# Patient Record
Sex: MALE | Race: OTHER | NOT HISPANIC OR LATINO | URBAN - METROPOLITAN AREA
[De-identification: names, ages, dates, MRNs, and addresses within clinical notes are randomized per-mention and may not be internally consistent; named-entity substitution may affect disease eponyms.]

---

## 2018-11-12 ENCOUNTER — EMERGENCY (EMERGENCY)
Facility: HOSPITAL | Age: 28
LOS: 1 days | Discharge: HOME | End: 2018-11-12
Attending: EMERGENCY MEDICINE | Admitting: EMERGENCY MEDICINE
Payer: MEDICAID

## 2018-11-12 VITALS
OXYGEN SATURATION: 99 % | HEART RATE: 55 BPM | DIASTOLIC BLOOD PRESSURE: 57 MMHG | SYSTOLIC BLOOD PRESSURE: 108 MMHG | TEMPERATURE: 99 F | RESPIRATION RATE: 16 BRPM

## 2018-11-12 LAB
ALBUMIN SERPL ELPH-MCNC: 5 G/DL — SIGNIFICANT CHANGE UP (ref 3.3–5)
ALP SERPL-CCNC: 64 U/L — SIGNIFICANT CHANGE UP (ref 40–120)
ALT FLD-CCNC: 18 U/L — SIGNIFICANT CHANGE UP (ref 4–41)
AST SERPL-CCNC: 17 U/L — SIGNIFICANT CHANGE UP (ref 4–40)
BILIRUB SERPL-MCNC: 0.4 MG/DL — SIGNIFICANT CHANGE UP (ref 0.2–1.2)
BUN SERPL-MCNC: 13 MG/DL — SIGNIFICANT CHANGE UP (ref 7–23)
CALCIUM SERPL-MCNC: 9.7 MG/DL — SIGNIFICANT CHANGE UP (ref 8.4–10.5)
CHLORIDE SERPL-SCNC: 101 MMOL/L — SIGNIFICANT CHANGE UP (ref 98–107)
CO2 SERPL-SCNC: 28 MMOL/L — SIGNIFICANT CHANGE UP (ref 22–31)
CREAT SERPL-MCNC: 1.09 MG/DL — SIGNIFICANT CHANGE UP (ref 0.5–1.3)
GLUCOSE SERPL-MCNC: 90 MG/DL — SIGNIFICANT CHANGE UP (ref 70–99)
HCT VFR BLD CALC: 42.2 % — SIGNIFICANT CHANGE UP (ref 39–50)
HGB BLD-MCNC: 14.5 G/DL — SIGNIFICANT CHANGE UP (ref 13–17)
MCHC RBC-ENTMCNC: 30.9 PG — SIGNIFICANT CHANGE UP (ref 27–34)
MCHC RBC-ENTMCNC: 34.4 % — SIGNIFICANT CHANGE UP (ref 32–36)
MCV RBC AUTO: 89.8 FL — SIGNIFICANT CHANGE UP (ref 80–100)
NRBC # FLD: 0 — SIGNIFICANT CHANGE UP
PLATELET # BLD AUTO: 355 K/UL — SIGNIFICANT CHANGE UP (ref 150–400)
PMV BLD: 9.1 FL — SIGNIFICANT CHANGE UP (ref 7–13)
POTASSIUM SERPL-MCNC: 3.8 MMOL/L — SIGNIFICANT CHANGE UP (ref 3.5–5.3)
POTASSIUM SERPL-SCNC: 3.8 MMOL/L — SIGNIFICANT CHANGE UP (ref 3.5–5.3)
PROT SERPL-MCNC: 8.1 G/DL — SIGNIFICANT CHANGE UP (ref 6–8.3)
RBC # BLD: 4.7 M/UL — SIGNIFICANT CHANGE UP (ref 4.2–5.8)
RBC # FLD: 12.6 % — SIGNIFICANT CHANGE UP (ref 10.3–14.5)
SODIUM SERPL-SCNC: 140 MMOL/L — SIGNIFICANT CHANGE UP (ref 135–145)
WBC # BLD: 8.15 K/UL — SIGNIFICANT CHANGE UP (ref 3.8–10.5)
WBC # FLD AUTO: 8.15 K/UL — SIGNIFICANT CHANGE UP (ref 3.8–10.5)

## 2018-11-12 PROCEDURE — 99220: CPT

## 2018-11-12 RX ORDER — PENICILLIN V POTASSIUM 250 MG
500 TABLET ORAL ONCE
Qty: 0 | Refills: 0 | Status: COMPLETED | OUTPATIENT
Start: 2018-11-12 | End: 2018-11-12

## 2018-11-12 RX ORDER — ACETAMINOPHEN 500 MG
650 TABLET ORAL ONCE
Qty: 0 | Refills: 0 | Status: COMPLETED | OUTPATIENT
Start: 2018-11-12 | End: 2018-11-12

## 2018-11-12 RX ADMIN — Medication 500 MILLIGRAM(S): at 16:11

## 2018-11-12 NOTE — ED CDU PROVIDER INITIAL DAY NOTE - MEDICAL DECISION MAKING DETAILS
28 y.o male no pmhx coming in with 1 week of intermittent L eye blurry vision- sent to CDU for MRI/MRA, Optho.

## 2018-11-12 NOTE — ED CDU PROVIDER INITIAL DAY NOTE - ATTENDING CONTRIBUTION TO CARE
I performed a face to face bedside interview with patient regarding history of present illness, review of symptoms and past medical history. I completed an independent physical exam.  I have discussed patient's plan of care.   I agree with note as stated above, having amended the EMR as needed to reflect my findings. I have discussed the assessment and plan of care.  This includes during the time I functioned as the attending physician for this patient.  Attending Contribution to Care: agree with plan of PA. no pmhx coming in with 1 week of intermittent L eye blurry vision - was seen by outpatient Neuroophthalmologist Dr. Samano diagnosed with ? optic neuropathy had patient scheduled for MRI next Wednesday.  Pt states L eye blurry vision returned and felt generalized weakness which prompted him to come in.

## 2018-11-12 NOTE — ED PROVIDER NOTE - NS ED ROS FT
Constitutional: - Fever, - Chills, - Anorexia, + Fatigue  Eyes: - Discharge, - Irritation, - Redness, + L Visual changes, - Light sensitivity  EARS: - Ear Pain, - Apparent hearing problems  NOSE: - Congestion, - Bloody nose  MOUTH/THROAT: - Vocal Changes, - Drooling, - Sore throat, + Dental Pain  NECK: - Lumps, - Stiffness, - Pain  NEURO: - Change in behavior, - Dec. Alertness, + Headache, - Dizziness, - Numbness/Tingling, - Change in speech, - Weakness

## 2018-11-12 NOTE — ED ADULT NURSE NOTE - OBJECTIVE STATEMENT
Pt c/oon and off loss of vision in left eye on/off for past couple of days.  Pt saw eye doctor and was told he has retinal hemorrhage of left eye and retinal vascular abnormality. Has mild headache now, no visual loss presently denies need for pain medication at this time.

## 2018-11-12 NOTE — ED ADULT TRIAGE NOTE - CHIEF COMPLAINT QUOTE
Pt c/o loss of vision in left eye on/off for past couple of days.  Pt saw eye doctor and was told he has retinal hemorrhage of left eye and retinal vascular abnormality.

## 2018-11-12 NOTE — ED CDU PROVIDER INITIAL DAY NOTE - OBJECTIVE STATEMENT
28 y.o male no pmhx coming in with 1 week of intermittent L eye blurry vision - was seen by outpatient Neuroophthalmologist Dr. Samano who had patient scheduled for MRI next Wednesday.  Pt states L eye blurry vision returned and felt. Denies   In the ED , Dr. Samano was contacted who made recommendations for imaging. Denies any current symptoms. 28 y.o male no pmhx coming in with 1 week of intermittent L eye blurry vision - was seen by outpatient Neuroophthalmologist Dr. Samano diagnosed with ? optic neuropathy had patient scheduled for MRI next Wednesday.  Pt states L eye blurry vision returned and felt generalized weakness which prompted him to come in. Denies fevers, chills, chest pain, sob, headache, dizziness, n/v/d, abdominal pain, numbness, tingling ,weakness. In the ED , Dr. Samano was contacted who made recommendations for imaging and was sent to CDU for MRI/ MRA. Optho was also consulted to see patient;. Denies any current symptoms.

## 2018-11-12 NOTE — ED PROVIDER NOTE - MEDICAL DECISION MAKING DETAILS
29 yo male with likely left optic neuropathy and dental infection. will consult optho for change in vision, pcn for infection, consider CDU stay for consults and MRI pending recommendations from optho.

## 2018-11-12 NOTE — ED PROVIDER NOTE - PHYSICAL EXAMINATION
PE:   GEN: Awake, alert, interactive, NAD, non-toxic appearing.   HEAD: Atraumatic  EYES: Sclera white, conjunctiva pink, PERRLA  EYE exam: VA: 20/20 R Eye: 20/20 L Eye: 20/20, visual fields intact. Sclera white, conjunctiva pink, Cornea clear. no chemosis, no perioribital swelling, no drainage/discharge. No Photosenstivity. PERRLA, EOMI. No FB on gross visualization or on lid eversion. Left Eye IOP = 18  MOUTH/DENTAL: Airway patent, uvula midline. Tooth #10 is chipped. no gingival edema or fluctuance. + percussion tenderness.  NEURO: AOx3, CN II-XII grossly intact without focal deficit.

## 2018-11-12 NOTE — ED CDU PROVIDER INITIAL DAY NOTE - DETAILS
28 y.o male no pmhx coming in with 1 week of intermittent L eye blurry vision- sent to CDU for MRI/MRA. 28 y.o male no pmhx coming in with 1 week of intermittent L eye blurry vision- sent to CDU for MRI/MRA, Optho.

## 2018-11-12 NOTE — ED PROVIDER NOTE - PROGRESS NOTE DETAILS
optho paged, pending call back. spoke with optho, recommend MRI and will see patient if MR is abnormal  discussed with Dr. Urban, CDU-->MR  discussed with Dr. Urrutia, accepted patient  paged Dr. Samano ([phone]) awaiting call back to determine best study spoke with Dr. Samano (cell) [phone] (fax)[phone]  recommends MR brain/orbits w/wo contrast and MR angio brain and neck w/ contrast.  call his cell if any changes or if + studies

## 2018-11-12 NOTE — ED PROVIDER NOTE - OBJECTIVE STATEMENT
this is a 27 yo male, previously healthy, presenting to the ED with complaints of changing and worsening left eye vision loss. Explains that the vision loss is episodic and started about 1 week ago and was just a small region of blackness. Pt went to neuro-optho at [place], Dr. Selwyn Tovar, and has scheduled fu in 2 weeks and a scheduled head/orbit MRI in 1 week to evaluate for likely Ileana Syndrome. Pt explains vision loss remains intermittent, but has changed in quality, to having blurred vision, floaters, spots of lights, and at times complete vision loss. It is now associated with mild headache, feeling of pressure behind the left eye and general fatigue. Pt additionally reports dental pain that has recently worsened due to previously chipped tooth that is sensitive to cold / hot foods and is concerned for infection. Pt denies fever, chills, change in voice, neck pain, chest pain, or any other concerns. PT brought copy of neuro-optho note and a copy was made with consent and reviewed.

## 2018-11-12 NOTE — ED PROVIDER NOTE - ATTENDING CONTRIBUTION TO CARE
MARKY GARCÍA MD: 29 yo male no significant past medical history presents with complaints of intermittent changing and loss of vision in the LEFT eye. Patient states about 1 week ago he developed a LEFT eye dark spot x several days.  Patient states he was scheduled for an outpatient MRI in 2 days, but told by specialist to go to the ER for any HA or other symptoms.  Patient states today he has a mild HA, but it's not typical for him, gradual onset and generalized fatigue.   Patient denies any visual complaints at present, but states last episode was en route on the way to the ER and one earlier this morning.      PHYSICAL EXAM:    GENERAL: Patient is awake and alert and in no acute distress.  Non-toxic appearing.  A+Ox4  HEAD:  Airway patent.  No oropharyngeal edema.  No stridor.  Auricles are normal.    EYES: EOM grossly intact, conjunctiva non-injected and sclera clear  NECK: Supple, No vertebral point tenderness to palpation.  CHEST/LUNG: Lungs clear to auscultation bilaterally; no wheeze, no rhonchi,  no rales.    HEART: Regular rate and rhythm;   ABDOMEN: Soft, non-tender to palpation.  No rebound/no guarding.  Bowel sounds present x 4.   MSK/EXTREMITIES: No clubbing or cyanosis. Back is nontender, with no vertebral point tenderness to palpation, no CVAT.  Moving all 4 extremities.     NEURO: Neurologically grossly intact.   No obvious deficits.   PSYCH: Psychiatrically normal mood and affect.  No apparent risk to self or others.       DR. FREEMAN, ATTENDING MD:    I performed a face to face bedside interview with patient regarding history of present illness, review of symptoms and past medical history. I completed an independent physical exam.  I have discussed patient's plan of care with the team of health care providers.   I agree with note as stated above, having amended the EMR as needed to reflect my findings. I have discussed the assessment and plan of care.  This includes during the time I functioned as the attending physician for this patient.

## 2018-11-13 VITALS
SYSTOLIC BLOOD PRESSURE: 111 MMHG | OXYGEN SATURATION: 99 % | HEART RATE: 52 BPM | TEMPERATURE: 98 F | DIASTOLIC BLOOD PRESSURE: 72 MMHG | RESPIRATION RATE: 16 BRPM

## 2018-11-13 PROCEDURE — 70553 MRI BRAIN STEM W/O & W/DYE: CPT | Mod: 26

## 2018-11-13 PROCEDURE — 99217: CPT

## 2018-11-13 PROCEDURE — 70548 MR ANGIOGRAPHY NECK W/DYE: CPT | Mod: 26

## 2018-11-13 PROCEDURE — 70543 MRI ORBT/FAC/NCK W/O &W/DYE: CPT | Mod: 26

## 2018-11-13 NOTE — ED CDU PROVIDER SUBSEQUENT DAY NOTE - PROGRESS NOTE DETAILS
Pt resting comfortably. No complaints. went for MRI last night, pending results. VSS. Will continue to observe and reassess in am [place]:  Spoke with patient's neuro-ophthalmologist, Dr. Guy Samano ([phone]).  Will discharge patient with copy of MRI report and patient to follow up with Dr. Samano  2pm at 11/14/18 Patient signed out to me to f/u MRI. Patient reassessed, sitting comfortably in bed in NAD, states seeing black spot in left eye, usually in the morning. MR reports shows changes of white matter, no significant finding. Discussed with attending, patient can be discharged home to f/u with PCP and Dr. Samano. The patient was given verbal and written discharge instructions. Specifically, instructions when to return to the ED and when to seek follow-up from their pcp was discussed. Any specialty follow-up was discussed, including how to make an appointment.  Instructions were discussed in simple, plain language and was understood by the patient. The patient understands that should their symptoms worsen or any new symptoms arise, they should return to the ED immediately for further evaluation. All pt's questions were answered. Patient verbalizes understanding.

## 2018-11-13 NOTE — ED CDU PROVIDER DISPOSITION NOTE - NSFOLLOWUPINSTRUCTIONS_ED_ALL_ED_FT
Please follow up with Dr. Samano (neuro-ophthalmologist) tomorrow 11/14/18 at 2pm.  Call him at [phone] if you need to re-schedule.

## 2018-11-13 NOTE — ED CDU PROVIDER SUBSEQUENT DAY NOTE - HISTORY
29 yo M, with no significant PMH p/w intermittent left blurry vision x1 week, sent to CDU for MRI/MRA, Optho.
parents

## 2018-11-13 NOTE — ED CDU PROVIDER SUBSEQUENT DAY NOTE - MEDICAL DECISION MAKING DETAILS
27 yo M, with no significant PMH p/w intermittent left blurry vision x1 week, sent to CDU for MRI/MRA, Optho.

## 2018-11-13 NOTE — ED CDU PROVIDER DISPOSITION NOTE - CLINICAL COURSE
[place]:  28M presented to ED with 1 week of intermittent left eye visual disturbance.  Sent to CDU for MRI/MRA. No acute findings on MRI/MRA.  Discharge home with copy of report and patient to follow up with his neuro-ophthalmologist tomorrow.

## 2018-11-13 NOTE — ED CDU PROVIDER SUBSEQUENT DAY NOTE - ATTENDING CONTRIBUTION TO CARE
Dr. Jackman:  I performed a face to face bedside interview with patient regarding history of present illness, review of symptoms and past medical history. I completed an independent physical exam.  I have discussed patient's plan of care with PA.   I agree with note as stated above, having amended the EMR as needed to reflect my findings.   This includes HISTORY OF PRESENT ILLNESS, HIV, PAST MEDICAL/SURGICAL/FAMILY/SOCIAL HISTORY, ALLERGIES AND HOME MEDICATIONS, REVIEW OF SYSTEMS, PHYSICAL EXAM, and any PROGRESS NOTES during the time I functioned as the attending physician for this patient.    28M presented to ED with 1 week of intermittent left eye visual disturbance.  Sent to CDU for MRI/MRA.  Describes a "dark spot" in center of vision this morning, which pt states he has been getting every morning that self-resolves in approximately 45min.    Exam:  - nad  - eomi, perrl  - rrr  - ctab  - abd soft ntnd    A/P  - visual defect, unclear etiology  - pending MRI/MRA read

## 2023-05-09 NOTE — ED CDU PROVIDER SUBSEQUENT DAY NOTE - CROS ED ROS STATEMENT
all other ROS negative except as per HPI Hydroxyzine Pregnancy And Lactation Text: This medication is not safe during pregnancy and should not be taken. It is also excreted in breast milk and breast feeding isn't recommended.

## 2023-12-18 ENCOUNTER — EMERGENCY (EMERGENCY)
Facility: HOSPITAL | Age: 33
LOS: 0 days | Discharge: HOME | End: 2023-12-18
Attending: STUDENT IN AN ORGANIZED HEALTH CARE EDUCATION/TRAINING PROGRAM
Payer: SELF-PAY

## 2023-12-18 VITALS
DIASTOLIC BLOOD PRESSURE: 78 MMHG | HEART RATE: 70 BPM | SYSTOLIC BLOOD PRESSURE: 115 MMHG | TEMPERATURE: 99 F | HEIGHT: 69 IN | RESPIRATION RATE: 18 BRPM | WEIGHT: 149.91 LBS | OXYGEN SATURATION: 100 %

## 2023-12-18 VITALS
OXYGEN SATURATION: 97 % | HEART RATE: 71 BPM | DIASTOLIC BLOOD PRESSURE: 74 MMHG | SYSTOLIC BLOOD PRESSURE: 112 MMHG | RESPIRATION RATE: 16 BRPM | TEMPERATURE: 98 F

## 2023-12-18 DIAGNOSIS — S62.307A UNSPECIFIED FRACTURE OF FIFTH METACARPAL BONE, LEFT HAND, INITIAL ENCOUNTER FOR CLOSED FRACTURE: ICD-10-CM

## 2023-12-18 DIAGNOSIS — R60.0 LOCALIZED EDEMA: ICD-10-CM
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
## 2023-12-18 DIAGNOSIS — Z91.013 ALLERGY TO SEAFOOD: ICD-10-CM
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
## 2023-12-18 DIAGNOSIS — M79.642 PAIN IN LEFT HAND: ICD-10-CM

## 2023-12-18 PROBLEM — H46.9 UNSPECIFIED OPTIC NEURITIS: Chronic | Status: ACTIVE | Noted: 2018-11-12

## 2023-12-18 PROCEDURE — 26600 TREAT METACARPAL FRACTURE: CPT | Mod: 54,LT

## 2023-12-18 PROCEDURE — 73130 X-RAY EXAM OF HAND: CPT | Mod: 26,LT,76

## 2023-12-18 PROCEDURE — 99284 EMERGENCY DEPT VISIT MOD MDM: CPT | Mod: 57

## 2023-12-18 PROCEDURE — 99053 MED SERV 10PM-8AM 24 HR FAC: CPT

## 2023-12-18 NOTE — ED PROVIDER NOTE - PHYSICAL EXAMINATION
General: Well appearing male in no acute distress  HEENT: Normocephalic, atraumatic. Moist mucous membranes. Oropharynx clear. No lymphadenopathy.  Eyes: No scleral icterus. EOMI. LÁZARO.  Neck:. Soft and supple. Full ROM without pain. No midline tenderness  Cardiac: Regular rate and regular rhythm. No murmurs, rubs, gallops. Peripheral pulses 2+ and symmetric. No LE edema.  Resp: Lungs CTAB. Speaking in full sentences. No wheezes, rales or rhonchi.  Abd: Soft, non-tender, non-distended. No guarding or rebound. No scars, masses, or lesions.  Back: Spine midline and non-tender. No CVA tenderness.    Skin: No rashes, abrasions, or lacerations.  Neuro: AO x 3. Moves all extremities symmetrically. Motor strength and sensation grossly intact.  MSK: edema over dorsum of R hand near 5th MCP region, minimally tender, able to make closed fist, thumbs up sign and OK sign. superficial abrasion over area of edema, no laceration/wound. radial pulse 2+, sensation intact. compartment of LUE are soft.

## 2023-12-18 NOTE — ED PROVIDER NOTE - CARE PROVIDER_API CALL
Ana Phipps  Plastic Surgery  [address] [place] [address], [address]  [place] [address]  Phone: [phone]  Fax: [phone]  Follow Up Time: 1-3 Days   Ana Phipps  Plastic Surgery  [address] [place] [address], [address]  [place] [address]  Phone: [phone]  Fax: [phone]  Follow Up Time: 1-3 Days

## 2023-12-18 NOTE — ED PROCEDURE NOTE - CPROC ED TIME OUT STATEMENT1
“Patient's name, , procedure and correct site were confirmed during the [place] Timeout.” “Patient's name, , procedure and correct site were confirmed during the [place] Timeout.”

## 2023-12-18 NOTE — ED PROVIDER NOTE - NSFOLLOWUPINSTRUCTIONS_ED_ALL_ED_FT
Keep splint on at all times.   followup with Dr. duran, hand surgeon, in next 7 days.    Fracture    A fracture is a break in one of your bones. This can occur from a variety of injuries, especially traumatic ones. Symptoms include pain, bruising, or swelling. Do not use the injured limb. If a fracture is in one of the bones below your waist, do not put weight on that limb unless instructed to do so by your healthcare provider. Crutches or a cane may have been provided. A splint or cast may have been applied by your health care provider. Make sure to keep it dry and follow up with an orthopedist as instructed.    SEEK IMMEDIATE MEDICAL CARE IF YOU HAVE ANY OF THE FOLLOWING SYMPTOMS: numbness, tingling, increasing pain, or weakness in any part of the injured limb.

## 2023-12-18 NOTE — ED PROVIDER NOTE - CLINICAL SUMMARY MEDICAL DECISION MAKING FREE TEXT BOX
states he punched a sheet rock wall w/ L hand. endorsing L hand dominant but also uses his R hand. 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   edema over 5th MCP region of the L hand dorsal aspect, minimal tenderness, superficial abrasion, no laceration/wound. neurovascularly intact. able to make closed fist but with pain, able to make thumbs up/OK sign.   xray - r/o fracture    xray shows displaced comminuted boxer fracture on wet read. is transverse and oblique. reduction attempted with Erin reduction technique but unsuccessful. patient made aware he needs to f/u with hand surgeon outpatient. spoke to dr. duran, hand surgeon on call who will see him in office. patient is comfortable w/ plan, return precautions discussed. ulnar gutter splint placed. 34 y/o M no pmhx presents w/ L hand pain. states he punched a sheet rock wall w/ L hand. endorsing L hand dominant but also uses his R hand. 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   edema over 5th MCP region of the L hand dorsal aspect, minimal tenderness, superficial abrasion, no laceration/wound. neurovascularly intact. able to make closed fist but with pain, able to make thumbs up/OK sign.   xray - r/o fracture    xray shows displaced comminuted boxer fracture on wet read. is transverse and oblique. reduction attempted with Jluiss reduction technique but unsuccessful. patient made aware he needs to f/u with hand surgeon outpatient. spoke to dr. duran, hand surgeon on call who will see him in office. patient is comfortable w/ plan, return precautions discussed. ulnar gutter splint placed. 32 y/o M no pmhx presents w/ L hand pain. states he punched a sheet rock wall w/ L hand. endorsing L hand dominant but also uses his R hand. 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   edema over 5th MCP region of the L hand dorsal aspect, minimal tenderness, superficial abrasion, no laceration/wound. neurovascularly intact. able to make closed fist but with pain, able to make thumbs up/OK sign.   xray - r/o fracture    xray shows displaced comminuted boxer fracture on wet read. is transverse and oblique. reduction attempted with Jluiss reduction technique but unsuccessful. patient made aware he needs to f/u with hand surgeon outpatient. spoke to dr. duran, hand surgeon on call who will see him in office. patient is comfortable w/ plan, return precautions discussed. ulnar gutter splint placed.

## 2023-12-18 NOTE — ED ADULT NURSE NOTE - OBJECTIVE STATEMENT
Pt is AOx4 c/o L hand injury. Pt is c/o swelling and pain to L hand after punching wall. Swelling and small abrasion noted to hand. Pt denies numbness/tingling, pulses and sensation intact on exam. Pt able to perform full ROM. Denies pmhx.

## 2023-12-18 NOTE — ED PROVIDER NOTE - PATIENT PORTAL LINK FT
You can access the FollowMyHealth Patient Portal offered by [facility] by registering at the following website: [email or web address]. By joining [employer]’s FollowMyHealth portal, you will also be able to view your health information using other applications (apps) compatible with our system. You can access the FollowMyHealth Patient Portal offered by [facility] by registering at the following website: [email or web address]. By joining [employer]’s FollowMyHealth portal, you will also be able to view your health information using other applications (apps) compatible with our system.

## 2023-12-18 NOTE — ED ADULT NURSE NOTE - NSFALLUNIVINTERV_ED_ALL_ED
Bed/Stretcher in lowest position, wheels locked, appropriate side rails in place/Call bell, personal items and telephone in reach/Instruct patient to call for assistance before getting out of bed/chair/stretcher/Non-slip footwear applied when patient is off stretcher/[place] to call system/Physically safe environment - no spills, clutter or unnecessary equipment/Purposeful proactive rounding/Room/bathroom lighting operational, light cord in reach Bed/Stretcher in lowest position, wheels locked, appropriate side rails in place/Call bell, personal items and telephone in reach/Instruct patient to call for assistance before getting out of bed/chair/stretcher/Non-slip footwear applied when patient is off stretcher/[place] to call system/Physically safe environment - no spills, clutter or unnecessary equipment/Purposeful proactive rounding/Room/bathroom lighting operational, light cord in reach

## 2023-12-18 NOTE — ED PROVIDER NOTE - OBJECTIVE STATEMENT
34 y/o M no pmhx presents w/ L hand pain. states he punched a sheet rock wall w/ L hand. endorsing L hand dominant but also uses his R hand. 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 32 y/o M no pmhx presents w/ L hand pain. states he punched a sheet rock wall w/ L hand. endorsing L hand dominant but also uses his R hand. 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

## 2024-01-24 PROBLEM — Z00.00 ENCOUNTER FOR PREVENTIVE HEALTH EXAMINATION: Status: ACTIVE | Noted: 2024-01-24

## 2024-01-31 ENCOUNTER — APPOINTMENT (OUTPATIENT)
Dept: ORTHOPEDIC SURGERY | Facility: HOSPITAL | Age: 34
End: 2024-01-31

## 2024-01-31 ENCOUNTER — APPOINTMENT (OUTPATIENT)
Dept: RADIOLOGY | Facility: HOSPITAL | Age: 34
End: 2024-01-31

## 2024-01-31 ENCOUNTER — OUTPATIENT (OUTPATIENT)
Dept: OUTPATIENT SERVICES | Facility: HOSPITAL | Age: 34
LOS: 1 days | End: 2024-01-31
Payer: SELF-PAY

## 2024-01-31 ENCOUNTER — RESULT REVIEW (OUTPATIENT)
Age: 34
End: 2024-01-31

## 2024-01-31 VITALS
DIASTOLIC BLOOD PRESSURE: 80 MMHG | WEIGHT: 157 LBS | TEMPERATURE: 98 F | HEART RATE: 59 BPM | BODY MASS INDEX: 23.25 KG/M2 | SYSTOLIC BLOOD PRESSURE: 119 MMHG | HEIGHT: 69 IN

## 2024-01-31 DIAGNOSIS — S62.92XS: ICD-10-CM

## 2024-01-31 PROCEDURE — 73130 X-RAY EXAM OF HAND: CPT | Mod: 26,LT

## 2024-01-31 PROCEDURE — 73100 X-RAY EXAM OF WRIST: CPT | Mod: 26,LT

## 2024-02-02 DIAGNOSIS — S62.327A DISPLACED FRACTURE OF SHAFT OF FIFTH METACARPAL BONE, LEFT HAND, INITIAL ENCOUNTER FOR CLOSED FRACTURE: ICD-10-CM

## 2024-02-05 ENCOUNTER — OUTPATIENT (OUTPATIENT)
Dept: OUTPATIENT SERVICES | Facility: HOSPITAL | Age: 34
LOS: 1 days | End: 2024-02-05
Payer: SELF-PAY

## 2024-02-05 VITALS
SYSTOLIC BLOOD PRESSURE: 126 MMHG | DIASTOLIC BLOOD PRESSURE: 80 MMHG | HEIGHT: 69 IN | WEIGHT: 156.97 LBS | OXYGEN SATURATION: 97 % | TEMPERATURE: 99 F | HEART RATE: 67 BPM | RESPIRATION RATE: 14 BRPM

## 2024-02-05 DIAGNOSIS — Z01.818 ENCOUNTER FOR OTHER PREPROCEDURAL EXAMINATION: ICD-10-CM

## 2024-02-05 DIAGNOSIS — S62.309A UNSPECIFIED FRACTURE OF UNSPECIFIED METACARPAL BONE, INITIAL ENCOUNTER FOR CLOSED FRACTURE: ICD-10-CM

## 2024-02-05 DIAGNOSIS — S62.327A DISPLACED FRACTURE OF SHAFT OF FIFTH METACARPAL BONE, LEFT HAND, INITIAL ENCOUNTER FOR CLOSED FRACTURE: ICD-10-CM
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
## 2024-02-05 LAB
HCT VFR BLD CALC: 42.6 % — SIGNIFICANT CHANGE UP (ref 39–50)
HGB BLD-MCNC: 14.6 G/DL — SIGNIFICANT CHANGE UP (ref 13–17)
MCHC RBC-ENTMCNC: 31 PG — SIGNIFICANT CHANGE UP (ref 27–34)
MCHC RBC-ENTMCNC: 34.3 GM/DL — SIGNIFICANT CHANGE UP (ref 32–36)
MCV RBC AUTO: 90.4 FL — SIGNIFICANT CHANGE UP (ref 80–100)
NRBC # BLD: 0 /100 WBCS — SIGNIFICANT CHANGE UP (ref 0–0)
PLATELET # BLD AUTO: 272 K/UL — SIGNIFICANT CHANGE UP (ref 150–400)
RBC # BLD: 4.71 M/UL — SIGNIFICANT CHANGE UP (ref 4.2–5.8)
RBC # FLD: 12.8 % — SIGNIFICANT CHANGE UP (ref 10.3–14.5)
WBC # BLD: 7.31 K/UL — SIGNIFICANT CHANGE UP (ref 3.8–10.5)
WBC # FLD AUTO: 7.31 K/UL — SIGNIFICANT CHANGE UP (ref 3.8–10.5)

## 2024-02-05 PROCEDURE — 85027 COMPLETE CBC AUTOMATED: CPT

## 2024-02-05 PROCEDURE — G0463: CPT

## 2024-02-05 RX ORDER — SODIUM CHLORIDE 9 MG/ML
3 INJECTION INTRAMUSCULAR; INTRAVENOUS; SUBCUTANEOUS EVERY 8 HOURS
Refills: 0 | Status: DISCONTINUED | OUTPATIENT
Start: 2024-02-15 | End: 2024-03-01

## 2024-02-05 RX ORDER — SODIUM CHLORIDE 9 MG/ML
1000 INJECTION, SOLUTION INTRAVENOUS
Refills: 0 | Status: DISCONTINUED | OUTPATIENT
Start: 2024-02-15 | End: 2024-03-01

## 2024-02-05 NOTE — H&P PST ADULT - NSANTHOSAYNRD_GEN_A_CORE
No. NORI screening performed.  STOP BANG Legend: 0-2 = LOW Risk; 3-4 = INTERMEDIATE Risk; 5-8 = HIGH Risk

## 2024-02-05 NOTE — H&P PST ADULT - PSYCHIATRIC
normal affect/alert and oriented x3/normal behavior details… S Plasty Text: Given the location and shape of the defect, and the orientation of relaxed skin tension lines, an S-plasty was deemed most appropriate for repair.  Using a sterile surgical marker, the appropriate outline of the S-plasty was drawn, incorporating the defect and placing the expected incisions within the relaxed skin tension lines where possible.  The area thus outlined was incised deep to adipose tissue with a #15 scalpel blade.  The skin margins were undermined to an appropriate distance in all directions utilizing iris scissors. The skin flaps were advanced over the defect.  The opposing margins were then approximated with interrupted buried subcutaneous sutures.

## 2024-02-05 NOTE — H&P PST ADULT - ASSESSMENT
Dental: denies loose teeth, no dentures    DASI: Active,    Score: 8.5 Dental: denies loose teeth, no dentures    DASI: Active, walking   Score: 8.5

## 2024-02-05 NOTE — H&P PST ADULT - NSICDXFAMILYHX_GEN_ALL_CORE_FT
FAMILY HISTORY:  Father  Still living? Yes, Estimated age: 71-80  FH: type 2 diabetes, Age at diagnosis: Age Unknown

## 2024-02-05 NOTE — H&P PST ADULT - PROBLEM SELECTOR PLAN 1
Scheduled for ORIF, left fifth metacarpal shaft fracture 2/15/2024  Preop instructions reviewed, written instructions sent home with patient  CBC drawn and sent, pending results  Chlorhexidine to affected area preop

## 2024-02-05 NOTE — H&P PST ADULT - HISTORY OF PRESENT ILLNESS
Mr. Alvarez is a 33 year old man with no significant medical history who sustained a left small finger injury on 12/17/2023.  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 Mr. Alvarez is a 33 year old man with no significant medical history who sustained a left small finger injury on 12/17/2023.   While home a lead pipe fell on his hand and dx with fracture of left hand, scheduled for ORIF left fifth metacarpal shaft fx on 2/15/2024.

## 2024-02-14 ENCOUNTER — TRANSCRIPTION ENCOUNTER (OUTPATIENT)
Age: 34
End: 2024-02-14

## 2024-02-15 ENCOUNTER — APPOINTMENT (OUTPATIENT)
Dept: ORTHOPEDIC SURGERY | Facility: HOSPITAL | Age: 34
End: 2024-02-15

## 2024-02-15 ENCOUNTER — OUTPATIENT (OUTPATIENT)
Dept: OUTPATIENT SERVICES | Facility: HOSPITAL | Age: 34
LOS: 1 days | End: 2024-02-15
Payer: SELF-PAY

## 2024-02-15 ENCOUNTER — TRANSCRIPTION ENCOUNTER (OUTPATIENT)
Age: 34
End: 2024-02-15

## 2024-02-15 VITALS
HEART RATE: 57 BPM | OXYGEN SATURATION: 98 % | DIASTOLIC BLOOD PRESSURE: 60 MMHG | HEIGHT: 69 IN | SYSTOLIC BLOOD PRESSURE: 97 MMHG | RESPIRATION RATE: 16 BRPM | TEMPERATURE: 97 F | WEIGHT: 151.9 LBS

## 2024-02-15 VITALS
SYSTOLIC BLOOD PRESSURE: 97 MMHG | RESPIRATION RATE: 17 BRPM | OXYGEN SATURATION: 98 % | DIASTOLIC BLOOD PRESSURE: 53 MMHG | HEART RATE: 50 BPM

## 2024-02-15 DIAGNOSIS — Z01.818 ENCOUNTER FOR OTHER PREPROCEDURAL EXAMINATION: ICD-10-CM

## 2024-02-15 DIAGNOSIS — S62.327A DISPLACED FRACTURE OF SHAFT OF FIFTH METACARPAL BONE, LEFT HAND, INITIAL ENCOUNTER FOR CLOSED FRACTURE: ICD-10-CM

## 2024-02-15 PROCEDURE — 76000 FLUOROSCOPY <1 HR PHYS/QHP: CPT

## 2024-02-15 PROCEDURE — C1713: CPT

## 2024-02-15 PROCEDURE — 26615 TREAT METACARPAL FRACTURE: CPT | Mod: LT

## 2024-02-15 DEVICE — K-WIRE MEDARTIS DOUBLE TROCAR 1.2MM X 150MM: Type: IMPLANTABLE DEVICE | Status: FUNCTIONAL

## 2024-02-15 DEVICE — IMPLANTABLE DEVICE: Type: IMPLANTABLE DEVICE | Status: FUNCTIONAL

## 2024-02-15 RX ORDER — CEFAZOLIN SODIUM 1 G
2000 VIAL (EA) INJECTION ONCE
Refills: 0 | Status: COMPLETED | OUTPATIENT
Start: 2024-02-15 | End: 2024-02-15

## 2024-02-15 RX ORDER — CHLORHEXIDINE GLUCONATE 213 G/1000ML
1 SOLUTION TOPICAL ONCE
Refills: 0 | Status: COMPLETED | OUTPATIENT
Start: 2024-02-15 | End: 2024-02-15

## 2024-02-15 RX ORDER — OXYCODONE 5 MG/1
5 TABLET ORAL
Qty: 12 | Refills: 0 | Status: ACTIVE | COMMUNITY
Start: 2024-02-15 | End: 1900-01-01

## 2024-02-15 RX ORDER — LIDOCAINE HCL 20 MG/ML
0.2 VIAL (ML) INJECTION ONCE
Refills: 0 | Status: COMPLETED | OUTPATIENT
Start: 2024-02-15 | End: 2024-02-15

## 2024-02-15 RX ADMIN — SODIUM CHLORIDE 3 MILLILITER(S): 9 INJECTION INTRAMUSCULAR; INTRAVENOUS; SUBCUTANEOUS at 12:36

## 2024-02-15 RX ADMIN — CHLORHEXIDINE GLUCONATE 1 APPLICATION(S): 213 SOLUTION TOPICAL at 12:40

## 2024-02-15 RX ADMIN — SODIUM CHLORIDE 100 MILLILITER(S): 9 INJECTION, SOLUTION INTRAVENOUS at 12:40

## 2024-02-15 NOTE — BRIEF OPERATIVE NOTE - NSICDXBRIEFPROCEDURE_GEN_ALL_CORE_FT
PROCEDURES:  Closed reduction and internal fixation of metacarpal of left hand 15-Feb-2024 13:37:21 [facility] Samira Toney

## 2024-02-15 NOTE — ASU DISCHARGE PLAN (ADULT/PEDIATRIC) - CARE PROVIDER_API CALL
Yaniv Villela Kismet  Orthopaedic Surgery  [address] [place] [address], [facility] [address]  [place] [address]  Phone: [phone]  Fax: [phone]  Follow Up Time: 1 week

## 2024-02-15 NOTE — ASU DISCHARGE PLAN (ADULT/PEDIATRIC) - NURSING INSTRUCTIONS
If needed you can take Tylenol 650mg-1000mg again at 7pm for pain. Please do not exceed more then 4000mg in 24 hours. You can also alternate with Ibuprofen 600mg and take if every 6 hours, 3 hours apart from Tylenol with food. The next time you can take Ibuprofen as soon as you get home.

## 2024-02-15 NOTE — ASU DISCHARGE PLAN (ADULT/PEDIATRIC) - ASU DC SPECIAL INSTRUCTIONSFT
Please see handout from Dr. Villela for specific instructions including follow up. If you are supposed to take any specific medications postoperatively, they have already been sent to your pharmacy.    Please do not remove splint, cannot get wet

## 2024-02-28 ENCOUNTER — APPOINTMENT (OUTPATIENT)
Dept: ORTHOPEDIC SURGERY | Facility: CLINIC | Age: 34
End: 2024-02-28
Payer: SELF-PAY

## 2024-02-28 PROCEDURE — 73130 X-RAY EXAM OF HAND: CPT | Mod: LT

## 2024-02-28 PROCEDURE — 99024 POSTOP FOLLOW-UP VISIT: CPT

## 2024-03-20 ENCOUNTER — APPOINTMENT (OUTPATIENT)
Dept: ORTHOPEDIC SURGERY | Facility: CLINIC | Age: 34
End: 2024-03-20

## 2024-03-29 ENCOUNTER — APPOINTMENT (OUTPATIENT)
Dept: ORTHOPEDIC SURGERY | Facility: CLINIC | Age: 34
End: 2024-03-29
Payer: SELF-PAY

## 2024-03-29 DIAGNOSIS — S62.327A DISPLACED FRACTURE OF SHAFT OF FIFTH METACARPAL BONE, LEFT HAND, INITIAL ENCOUNTER FOR CLOSED FRACTURE: ICD-10-CM

## 2024-03-29 PROCEDURE — 73130 X-RAY EXAM OF HAND: CPT | Mod: LT

## 2024-03-29 PROCEDURE — 99024 POSTOP FOLLOW-UP VISIT: CPT

## 2024-04-23 NOTE — ED PROVIDER NOTE - SECONDARY DIAGNOSIS.
How Severe Are Your Spot(S)?: moderate What Type Of Note Output Would You Prefer (Optional)?: Bullet Format What Is The Reason For Today's Visit?: Full Body Skin Examination What Is The Reason For Today's Visit? (Being Monitored For X): concerning skin lesions on an annual basis Dental infection

## 2024-05-14 ENCOUNTER — APPOINTMENT (OUTPATIENT)
Dept: ORTHOPEDIC SURGERY | Facility: CLINIC | Age: 34
End: 2024-05-14

## (undated) DEVICE — WARMING BLANKET LOWER ADULT

## (undated) DEVICE — DRAPE C ARM MINI PACK FOR 6800

## (undated) DEVICE — VENODYNE/SCD SLEEVE CALF LARGE

## (undated) DEVICE — Device

## (undated) DEVICE — DRSG COBAN 4"

## (undated) DEVICE — BLADE SURGICAL #15 CARBON

## (undated) DEVICE — DRSG CAST PLASTER XFAST 3"

## (undated) DEVICE — DRSG KLING 3"

## (undated) DEVICE — SUT MONOCRYL 4-0 18" P-3 UNDYED

## (undated) DEVICE — PACK HAND

## (undated) DEVICE — GLV 7 PROTEXIS (WHITE)

## (undated) DEVICE — DRILL BIT MEDARTIS CANN 2.4MM

## (undated) DEVICE — DRSG ACE BANDAGE 4" NS

## (undated) DEVICE — SOL IRR POUR NS 0.9% 500ML

## (undated) DEVICE — DRSG CAST PLASTER 3" (BLUE)

## (undated) DEVICE — SUT POLYSORB 3-0 18" P-12 UNDYED

## (undated) DEVICE — SPECIMEN CONTAINER 100ML

## (undated) DEVICE — POSITIONER FOAM EGG CRATE ULNAR 2PCS (PINK)

## (undated) DEVICE — TOURNIQUET CUFF 24" DUAL PORT DUAL BLADDER W PLC (BLACK)

## (undated) DEVICE — TOURNIQUET CUFF 18" DUAL PORT DUAL BLADDER W PLC (BLACK)

## (undated) DEVICE — GOWN TRIMAX LG

## (undated) DEVICE — SOL IRR POUR H2O 250ML

## (undated) DEVICE — TOURNIQUET ESMARK 4"

## (undated) DEVICE — DRSG WEBRIL 4"

## (undated) DEVICE — SUT MONOCRYL 4-0 18" P-3

## (undated) DEVICE — PREP CHLORAPREP HI-LITE ORANGE 26ML